# Patient Record
Sex: FEMALE | Race: WHITE | Employment: UNEMPLOYED | URBAN - METROPOLITAN AREA
[De-identification: names, ages, dates, MRNs, and addresses within clinical notes are randomized per-mention and may not be internally consistent; named-entity substitution may affect disease eponyms.]

---

## 2017-08-28 ENCOUNTER — HOSPITAL ENCOUNTER (OUTPATIENT)
Dept: PREADMISSION TESTING | Age: 47
Discharge: HOME | End: 2017-08-28
Payer: COMMERCIAL

## 2017-08-28 VITALS — BODY MASS INDEX: 18.96 KG/M2 | HEIGHT: 63 IN | WEIGHT: 107 LBS

## 2017-08-28 LAB
HCT VFR BLD AUTO: 35.1 % (ref 35–45)
HGB BLD-MCNC: 11.5 G/DL (ref 12–16)

## 2017-08-28 PROCEDURE — 85018 HEMOGLOBIN: CPT | Performed by: ANESTHESIOLOGY

## 2017-08-28 RX ORDER — DICYCLOMINE HYDROCHLORIDE 20 MG/1
20 TABLET ORAL
COMMUNITY

## 2017-08-28 RX ORDER — SODIUM CHLORIDE, SODIUM LACTATE, POTASSIUM CHLORIDE, CALCIUM CHLORIDE 600; 310; 30; 20 MG/100ML; MG/100ML; MG/100ML; MG/100ML
125 INJECTION, SOLUTION INTRAVENOUS CONTINUOUS
Status: CANCELLED | OUTPATIENT
Start: 2017-08-28

## 2017-08-30 NOTE — H&P
PATIENT: Mena Monaco  YOB: 1970  DATE: 2017 2:45 PM   VISIT TYPE: Consult  _____________________________________________________________    Completed Orders (this encounter)  Order Interpretation Result Next Lab Date   surgery instuctions given education        Assessment/Plan  # Detail Type Description    1. Assessment Calculus of gallbladder w chronic cholecyst w/o obstruction (K80.10). Patient Plan  discussed the pathophysiology of gallstone disease, stones will not resolve spontaneousl,y discussed the risks of suregry versus no surgery, discussed choledocolithiasis, gallstone pancreatitis and acute cholecystitis                  This 52year old female presents for Gallbladder Disease. History of Present Illness:  1. Gallbladder Disease      Onset was 2 weeks ago. Severity: 5. The problem is improving. It occurs intermittently. Location is epigastric and RUQ. There is no radiation. The patient describes it as colicky. Context includes after meals. There are no identified risk factors. Symptom is aggravated by fatty foods. Denies relieving factors. Additional information:  US shows gallstones, no jaundice, no f/c, pain has resolved.                 PROBLEM LIST:  Problem Description Onset Date   Acquired curvature of spine 2009   Endometriosis (clinical) 2009   Vitamin D deficiency 2011   Irritable bowel syndrome 2010       PAST MEDICAL/SURGICAL HISTORY  (Detailed)    Disease/disorder Onset Date Management Date Comments   proctoplasty 2002        Hysterectomy, total abdominal, BSO       Bilateral tubal ligation       Family History:  (Detailed)  Relationship Family Member Name  Age at Death Condition Onset Age Cause of Death       No family history of Cancer, breast  N   Maternal grandmother    Diabetes mellitus  N   Maternal grandmother    Stroke  N   Mother    Coronary artery disease  N       Social History:  (Detailed)  Tobacco use reviewed. The patient is right-handed. Preferred language is [place]. MARITAL STATUS/FAMILY/SOCIAL SUPPORT  Currently . CHILDREN  Has children:  1 daughter(s). Smoking status: Never smoker. SMOKING STATUS  Use Status Type Smoking Status Usage Per Day Years Used Total Pack Years   no/never  Never smoker          ALCOHOL  There is a history of alcohol use. consumed rarely. CAFFEINE  The patient uses caffeine: coffee and soda. Alejandra Loose Christianity/SPIRITUAL  The patient has None Alevism affiliation. Patient Status   Completed with information received for patient transitioning into care. Completed with information received for patient in a summary of care record. Medication Reconciliation  Medications reconciled today. Allergies:  Ingredient Reaction Medication Name Comment   IODINE Unknown     SERTRALINE HCL headache  Zoloft   (Reviewed, no changes.)  Review of Systems  System Neg/Pos Details   Constitutional Negative Chills, fever, night sweats and weight loss. ENMT Negative Hearing loss. Respiratory Negative Cough, dyspnea, known TB exposure and wheezing. Cardio Negative Chest pain, claudication, edema and irregular heartbeat/palpitations. GI Positive Abdominal pain, Bloating. GI Negative Abdominal mass, blood in stool, constipation, diarrhea, jaundice, nausea and vomiting.  Negative Dysuria, hematuria and urinary incontinence. Endocrine Negative Cold intolerance. Neuro Negative Headache and seizures. Integumentary Negative Change in shape/size of mole(s) and skin lesion. MS Negative Back pain and muscle weakness. Hema/Lymph Negative Easy bleeding and easy bruising. Allergic/Immuno Negative Contact allergy. Reproductive Negative Vaginal discharge.           Vital Signs     Height  Time ft in cm Last Measured Height Position   2:38 PM 5.0 3.00 160.02 08/22/2017 Standing     Weight/BSA/BMI  Time lb oz kg Context BMI kg/m2 BSA m2   2:38 .00 49.895 dressed with shoes 19.49      Blood Pressure  Time BP mm/Hg Position Side Site Method Cuff Size   2:38 /67 sitting right arm automatic adult     Temperature/Pulse/Respiration  Time Temp F Temp C Temp Site Pulse/min Pattern Resp/ min   2:38 PM 98.00 36.67 ear 87 regular      Measured By  Time Measured by   2:38 PM Chris Burton       Physical Exam:  Exam Findings Details   Constitutional Normal No acute distress. Well nourished. Well developed. Eyes Normal General - Right: Normal, Left: Normal. Sclera - Right: Normal, Left: Normal.   Neck Exam Normal Inspection - Normal.   Respiratory Normal Cough - Absent. Effort - Normal.   Cardiovascular Normal Inspection - JVD: Absent. Heart rate - Regular rate. Rhythm - Regular. Abdomen Normal Patient is not obese. No abdominal tenderness. No hepatic enlargement. No spleen enlargement. No hernia. No ascites. No palpable mass. Skin * Inspection - General inspection: warm and dry, anicteric. Extremity Normal No Cyanosis. No Edema. Neurological Normal Level of consciousness - Normal. Orientation - Normal. Memory - Normal. Hand dominance - Right-handed. Psychiatric Normal Orientation - Oriented to time, place, person & situation. No agitation. Not anxious. Appropriate mood and affect.          Medications (added, continued, or stopped this visit):  Started Medication Directions Instruction Stopped   03/15/2017 alprazolam 0.5 mg tablet take 0.5 - 1 tablet by ORAL route  in PM as needed     02/26/2013 EpiPen 0.3 mg/0.3 mL (1:1,000) IM Injector inject (0.3MG)  by intramuscular route onceas needed for anaphylaxis     08/11/2017 ibuprofen 800 mg tablet take 1 tablet by oral route 3 times every day with food     08/11/2017 Tylenol Extra Strength 500 mg tablet take 2 tablet by oral route  every 4 - 6 hours as needed not to exceed 8 tablets per 24hrs     Completed by:        Robyn Linton 08/24/2017 5:36 PM   Document generated by:  Tenna Lennox 08/24/2017 05:36 PM     -----------------------------------------------------------------------------------------------------------                          Electronically signed by Juany Gomes MD on 08/25/2017 08:44 AM

## 2017-08-31 ENCOUNTER — ANESTHESIA EVENT (OUTPATIENT)
Dept: SURGERY | Age: 47
End: 2017-08-31
Payer: COMMERCIAL

## 2017-08-31 ENCOUNTER — HOSPITAL ENCOUNTER (OUTPATIENT)
Age: 47
Setting detail: OUTPATIENT SURGERY
Discharge: HOME | End: 2017-08-31
Attending: SURGERY | Admitting: SURGERY
Payer: COMMERCIAL

## 2017-08-31 ENCOUNTER — ANESTHESIA (OUTPATIENT)
Dept: SURGERY | Age: 47
End: 2017-08-31
Payer: COMMERCIAL

## 2017-08-31 VITALS
RESPIRATION RATE: 18 BRPM | BODY MASS INDEX: 19.4 KG/M2 | SYSTOLIC BLOOD PRESSURE: 137 MMHG | HEIGHT: 62 IN | DIASTOLIC BLOOD PRESSURE: 74 MMHG | WEIGHT: 105.44 LBS | OXYGEN SATURATION: 99 % | TEMPERATURE: 98.1 F | HEART RATE: 70 BPM

## 2017-08-31 PROCEDURE — 77030008603 HC TRCR ENDOSC EPATH J&J -C: Performed by: SURGERY

## 2017-08-31 PROCEDURE — 77030020053 HC ELECTRD LAPSCP COVD -B: Performed by: SURGERY

## 2017-08-31 PROCEDURE — 77030008602 HC TRCR ENDOSC EPATH J&J -B: Performed by: SURGERY

## 2017-08-31 PROCEDURE — 74011250636 HC RX REV CODE- 250/636

## 2017-08-31 PROCEDURE — 77030018836 HC SOL IRR NACL ICUM -A: Performed by: SURGERY

## 2017-08-31 PROCEDURE — 77030008683 HC TU ET CUF COVD -A: Performed by: NURSE ANESTHETIST, CERTIFIED REGISTERED

## 2017-08-31 PROCEDURE — 77030032490 HC SLV COMPR SCD KNE COVD -B: Performed by: SURGERY

## 2017-08-31 PROCEDURE — 77030008518 HC TBNG INSUF ENDO STRY -B: Performed by: SURGERY

## 2017-08-31 PROCEDURE — 74011000250 HC RX REV CODE- 250

## 2017-08-31 PROCEDURE — 77030010030: Performed by: SURGERY

## 2017-08-31 PROCEDURE — 76210000016 HC OR PH I REC 1 TO 1.5 HR: Performed by: SURGERY

## 2017-08-31 PROCEDURE — 77030020782 HC GWN BAIR PAWS FLX 3M -B: Performed by: SURGERY

## 2017-08-31 PROCEDURE — 77030018875 HC APPL CLP LIG4 J&J -B: Performed by: SURGERY

## 2017-08-31 PROCEDURE — 76010000149 HC OR TIME 1 TO 1.5 HR: Performed by: SURGERY

## 2017-08-31 PROCEDURE — 77030003580 HC NDL INSUF VERES J&J -B: Performed by: SURGERY

## 2017-08-31 PROCEDURE — 76060000033 HC ANESTHESIA 1 TO 1.5 HR: Performed by: SURGERY

## 2017-08-31 PROCEDURE — 77030002935 HC SUT MCRYL J&J -C: Performed by: SURGERY

## 2017-08-31 PROCEDURE — 74011000250 HC RX REV CODE- 250: Performed by: SURGERY

## 2017-08-31 PROCEDURE — 77030008477 HC STYL SATN SLP COVD -A: Performed by: NURSE ANESTHETIST, CERTIFIED REGISTERED

## 2017-08-31 PROCEDURE — 77030031139 HC SUT VCRL2 J&J -A: Performed by: SURGERY

## 2017-08-31 PROCEDURE — 88304 TISSUE EXAM BY PATHOLOGIST: CPT | Performed by: SURGERY

## 2017-08-31 PROCEDURE — 76210000021 HC REC RM PH II 0.5 TO 1 HR: Performed by: SURGERY

## 2017-08-31 PROCEDURE — 74011250637 HC RX REV CODE- 250/637: Performed by: ANESTHESIOLOGY

## 2017-08-31 PROCEDURE — 77030020255 HC SOL INJ LR 1000ML BG: Performed by: SURGERY

## 2017-08-31 RX ORDER — FENTANYL CITRATE 50 UG/ML
INJECTION, SOLUTION INTRAMUSCULAR; INTRAVENOUS AS NEEDED
Status: DISCONTINUED | OUTPATIENT
Start: 2017-08-31 | End: 2017-08-31 | Stop reason: HOSPADM

## 2017-08-31 RX ORDER — NALOXONE HYDROCHLORIDE 0.4 MG/ML
INJECTION, SOLUTION INTRAMUSCULAR; INTRAVENOUS; SUBCUTANEOUS AS NEEDED
Status: DISCONTINUED | OUTPATIENT
Start: 2017-08-31 | End: 2017-08-31 | Stop reason: HOSPADM

## 2017-08-31 RX ORDER — PROPOFOL 10 MG/ML
INJECTION, EMULSION INTRAVENOUS AS NEEDED
Status: DISCONTINUED | OUTPATIENT
Start: 2017-08-31 | End: 2017-08-31 | Stop reason: HOSPADM

## 2017-08-31 RX ORDER — ONDANSETRON 2 MG/ML
4 INJECTION INTRAMUSCULAR; INTRAVENOUS ONCE
Status: DISCONTINUED | OUTPATIENT
Start: 2017-08-31 | End: 2017-08-31 | Stop reason: HOSPADM

## 2017-08-31 RX ORDER — DEXTROSE 50 % IN WATER (D50W) INTRAVENOUS SYRINGE
25-50 AS NEEDED
Status: DISCONTINUED | OUTPATIENT
Start: 2017-08-31 | End: 2017-08-31 | Stop reason: HOSPADM

## 2017-08-31 RX ORDER — ONDANSETRON 2 MG/ML
INJECTION INTRAMUSCULAR; INTRAVENOUS AS NEEDED
Status: DISCONTINUED | OUTPATIENT
Start: 2017-08-31 | End: 2017-08-31 | Stop reason: HOSPADM

## 2017-08-31 RX ORDER — SODIUM CHLORIDE, SODIUM LACTATE, POTASSIUM CHLORIDE, CALCIUM CHLORIDE 600; 310; 30; 20 MG/100ML; MG/100ML; MG/100ML; MG/100ML
50 INJECTION, SOLUTION INTRAVENOUS CONTINUOUS
Status: DISCONTINUED | OUTPATIENT
Start: 2017-08-31 | End: 2017-08-31 | Stop reason: HOSPADM

## 2017-08-31 RX ORDER — MIDAZOLAM HYDROCHLORIDE 1 MG/ML
INJECTION, SOLUTION INTRAMUSCULAR; INTRAVENOUS AS NEEDED
Status: DISCONTINUED | OUTPATIENT
Start: 2017-08-31 | End: 2017-08-31 | Stop reason: HOSPADM

## 2017-08-31 RX ORDER — OXYCODONE AND ACETAMINOPHEN 5; 325 MG/1; MG/1
1 TABLET ORAL AS NEEDED
Status: DISCONTINUED | OUTPATIENT
Start: 2017-08-31 | End: 2017-08-31 | Stop reason: HOSPADM

## 2017-08-31 RX ORDER — GLYCOPYRROLATE 0.2 MG/ML
INJECTION INTRAMUSCULAR; INTRAVENOUS AS NEEDED
Status: DISCONTINUED | OUTPATIENT
Start: 2017-08-31 | End: 2017-08-31 | Stop reason: HOSPADM

## 2017-08-31 RX ORDER — BUPIVACAINE HYDROCHLORIDE 5 MG/ML
INJECTION, SOLUTION EPIDURAL; INTRACAUDAL AS NEEDED
Status: DISCONTINUED | OUTPATIENT
Start: 2017-08-31 | End: 2017-08-31 | Stop reason: HOSPADM

## 2017-08-31 RX ORDER — FENTANYL CITRATE 50 UG/ML
25 INJECTION, SOLUTION INTRAMUSCULAR; INTRAVENOUS
Status: ACTIVE | OUTPATIENT
Start: 2017-08-31 | End: 2017-08-31

## 2017-08-31 RX ORDER — MAGNESIUM SULFATE 100 %
4 CRYSTALS MISCELLANEOUS AS NEEDED
Status: DISCONTINUED | OUTPATIENT
Start: 2017-08-31 | End: 2017-08-31 | Stop reason: HOSPADM

## 2017-08-31 RX ORDER — HYDROMORPHONE HYDROCHLORIDE 2 MG/ML
0.5 INJECTION, SOLUTION INTRAMUSCULAR; INTRAVENOUS; SUBCUTANEOUS
Status: DISCONTINUED | OUTPATIENT
Start: 2017-08-31 | End: 2017-08-31 | Stop reason: HOSPADM

## 2017-08-31 RX ORDER — OXYCODONE AND ACETAMINOPHEN 5; 325 MG/1; MG/1
1 TABLET ORAL
Qty: 30 TAB | Refills: 0 | Status: SHIPPED | OUTPATIENT
Start: 2017-08-31

## 2017-08-31 RX ORDER — INSULIN LISPRO 100 [IU]/ML
INJECTION, SOLUTION INTRAVENOUS; SUBCUTANEOUS ONCE
Status: DISCONTINUED | OUTPATIENT
Start: 2017-08-31 | End: 2017-08-31 | Stop reason: HOSPADM

## 2017-08-31 RX ORDER — SODIUM CHLORIDE, SODIUM LACTATE, POTASSIUM CHLORIDE, CALCIUM CHLORIDE 600; 310; 30; 20 MG/100ML; MG/100ML; MG/100ML; MG/100ML
125 INJECTION, SOLUTION INTRAVENOUS CONTINUOUS
Status: DISCONTINUED | OUTPATIENT
Start: 2017-08-31 | End: 2017-08-31 | Stop reason: HOSPADM

## 2017-08-31 RX ORDER — SODIUM CHLORIDE, SODIUM LACTATE, POTASSIUM CHLORIDE, CALCIUM CHLORIDE 600; 310; 30; 20 MG/100ML; MG/100ML; MG/100ML; MG/100ML
INJECTION, SOLUTION INTRAVENOUS
Status: DISCONTINUED | OUTPATIENT
Start: 2017-08-31 | End: 2017-08-31 | Stop reason: HOSPADM

## 2017-08-31 RX ORDER — NALOXONE HYDROCHLORIDE 0.4 MG/ML
0.1 INJECTION, SOLUTION INTRAMUSCULAR; INTRAVENOUS; SUBCUTANEOUS
Status: DISCONTINUED | OUTPATIENT
Start: 2017-08-31 | End: 2017-08-31 | Stop reason: HOSPADM

## 2017-08-31 RX ORDER — LIDOCAINE HYDROCHLORIDE 20 MG/ML
INJECTION, SOLUTION EPIDURAL; INFILTRATION; INTRACAUDAL; PERINEURAL AS NEEDED
Status: DISCONTINUED | OUTPATIENT
Start: 2017-08-31 | End: 2017-08-31 | Stop reason: HOSPADM

## 2017-08-31 RX ORDER — SODIUM CHLORIDE 0.9 % (FLUSH) 0.9 %
5-10 SYRINGE (ML) INJECTION AS NEEDED
Status: DISCONTINUED | OUTPATIENT
Start: 2017-08-31 | End: 2017-08-31 | Stop reason: HOSPADM

## 2017-08-31 RX ORDER — ROCURONIUM BROMIDE 10 MG/ML
INJECTION, SOLUTION INTRAVENOUS AS NEEDED
Status: DISCONTINUED | OUTPATIENT
Start: 2017-08-31 | End: 2017-08-31 | Stop reason: HOSPADM

## 2017-08-31 RX ORDER — NEOSTIGMINE METHYLSULFATE 1 MG/ML
INJECTION INTRAVENOUS AS NEEDED
Status: DISCONTINUED | OUTPATIENT
Start: 2017-08-31 | End: 2017-08-31 | Stop reason: HOSPADM

## 2017-08-31 RX ORDER — DEXAMETHASONE SODIUM PHOSPHATE 4 MG/ML
INJECTION, SOLUTION INTRA-ARTICULAR; INTRALESIONAL; INTRAMUSCULAR; INTRAVENOUS; SOFT TISSUE AS NEEDED
Status: DISCONTINUED | OUTPATIENT
Start: 2017-08-31 | End: 2017-08-31 | Stop reason: HOSPADM

## 2017-08-31 RX ADMIN — FENTANYL CITRATE 50 MCG: 50 INJECTION, SOLUTION INTRAMUSCULAR; INTRAVENOUS at 11:34

## 2017-08-31 RX ADMIN — ONDANSETRON 4 MG: 2 INJECTION INTRAMUSCULAR; INTRAVENOUS at 11:41

## 2017-08-31 RX ADMIN — SODIUM CHLORIDE, SODIUM LACTATE, POTASSIUM CHLORIDE, CALCIUM CHLORIDE: 600; 310; 30; 20 INJECTION, SOLUTION INTRAVENOUS at 11:56

## 2017-08-31 RX ADMIN — NALOXONE HYDROCHLORIDE 0.02 MG: 0.4 INJECTION, SOLUTION INTRAMUSCULAR; INTRAVENOUS; SUBCUTANEOUS at 12:21

## 2017-08-31 RX ADMIN — GLYCOPYRROLATE 0.2 MG: 0.2 INJECTION INTRAMUSCULAR; INTRAVENOUS at 12:02

## 2017-08-31 RX ADMIN — NEOSTIGMINE METHYLSULFATE 2 MG: 1 INJECTION INTRAVENOUS at 12:02

## 2017-08-31 RX ADMIN — PROPOFOL 50 MG: 10 INJECTION, EMULSION INTRAVENOUS at 11:48

## 2017-08-31 RX ADMIN — LIDOCAINE HYDROCHLORIDE 60 MG: 20 INJECTION, SOLUTION EPIDURAL; INFILTRATION; INTRACAUDAL; PERINEURAL at 11:34

## 2017-08-31 RX ADMIN — MIDAZOLAM HYDROCHLORIDE 2 MG: 1 INJECTION, SOLUTION INTRAMUSCULAR; INTRAVENOUS at 11:26

## 2017-08-31 RX ADMIN — DEXAMETHASONE SODIUM PHOSPHATE 4 MG: 4 INJECTION, SOLUTION INTRA-ARTICULAR; INTRALESIONAL; INTRAMUSCULAR; INTRAVENOUS; SOFT TISSUE at 11:41

## 2017-08-31 RX ADMIN — ROCURONIUM BROMIDE 25 MG: 10 INJECTION, SOLUTION INTRAVENOUS at 11:34

## 2017-08-31 RX ADMIN — PROPOFOL 120 MG: 10 INJECTION, EMULSION INTRAVENOUS at 11:34

## 2017-08-31 RX ADMIN — OXYCODONE HYDROCHLORIDE AND ACETAMINOPHEN 1 TABLET: 5; 325 TABLET ORAL at 14:17

## 2017-08-31 RX ADMIN — SODIUM CHLORIDE, SODIUM LACTATE, POTASSIUM CHLORIDE, CALCIUM CHLORIDE: 600; 310; 30; 20 INJECTION, SOLUTION INTRAVENOUS at 11:26

## 2017-08-31 RX ADMIN — FENTANYL CITRATE 50 MCG: 50 INJECTION, SOLUTION INTRAMUSCULAR; INTRAVENOUS at 11:26

## 2017-08-31 NOTE — IP AVS SNAPSHOT
[address] [place] [id number] Patient: Yenny Reddy MRN: [id number] BBX:[id number] You are allergic to the following Allergen Reactions Iodine Anaphylaxis Hives Shortness of Breath Rash Itching Swelling Shellfish Derived Anaphylaxis Recent Documentation Height Weight BMI OB Status Smoking Status 1.575 m 47.8 kg 19.28 kg/m2 Hysterectomy Never Smoker Emergency Contacts Name Discharge Info Relation Home Work Mobile [address] CAREGIVER [3] Spouse [3] [phone] About your hospitalization You were admitted on:  August 31, 2017 You last received care in the:  [facility] PHASE 2 RECOVERY You were discharged on:  August 31, 2017 Unit phone number:    
  
Why you were hospitalized Your primary diagnosis was:  Not on File Providers Seen During Your Hospitalizations Provider Role Specialty Primary office phone Paula Valladares MD Attending Provider General Surgery [phone] Your Primary Care Physician (PCP) Primary Care Physician Office Phone Office Fax [place], [address] [facility] [phone] Follow-up Information Follow up With Details Comments Contact Info José Berry MD   [address] [facility] 
[address] [place] [id number] [phone] Paula Valladares MD Follow up in 1 week(s)  [address] [facility] [address] [facility] 
[phone] Current Discharge Medication List  
  
START taking these medications Dose & Instructions Dispensing Information Comments Morning Noon Evening Bedtime  
 oxyCODONE-acetaminophen 5-325 mg per tablet Commonly known as:  PERCOCET Your last dose was: Your next dose is:    
   
   
 Dose:  1 Tab Take 1 Tab by mouth every six (6) hours as needed for Pain. Max Daily Amount: 4 Tabs. Quantity:  30 Tab Refills:  0 CONTINUE these medications which have NOT CHANGED Dose & Instructions Dispensing Information Comments Morning Noon Evening Bedtime BENTYL 20 mg tablet Generic drug:  dicyclomine Your last dose was: Your next dose is:    
   
   
 Dose:  20 mg Take 20 mg by mouth four (4) times daily as needed. Refills:  0 Where to Get Your Medications Information on where to get these meds will be given to you by the nurse or doctor. ! Ask your nurse or doctor about these medications  
  oxyCODONE-acetaminophen 5-325 mg per tablet Discharge Instructions Cholecystectomy: What to Expect at [facility] Your Recovery After your surgery, it is normal to feel weak and tired for several days after you return home. Your belly may be swollen. If you had laparoscopic surgery, you may also have pain in your shoulder for about 24 hours. You may have gas or need to burp a lot at first, and a few people get diarrhea. The diarrhea usually goes away in 2 to 4 weeks, but it may last longer. How quickly you recover depends on whether you had a laparoscopic or open surgery. · For a laparoscopic surgery, most people can go back to work or their normal routine in 1 to 2 weeks, but it may take longer, depending on the type of work you do. · For an open surgery, it will probably take 4 to 6 weeks before you get back to your normal routine. This care sheet gives you a general idea about how long it will take for you to recover. However, each person recovers at a different pace. Follow the steps below to get better as quickly as possible. How can you care for yourself at home? Activity · Rest when you feel tired. Getting enough sleep will help you recover. · Try to walk each day. Start out by walking a little more than you did the day before. Gradually increase the amount you walk.  Walking boosts blood flow and helps prevent pneumonia and constipation. · For about 2 to 4 weeks, avoid lifting anything that would make you strain. This may include a child, heavy grocery bags and milk containers, a heavy briefcase or backpack, cat litter or dog food bags, or a vacuum . · Avoid strenuous activities, such as biking, jogging, weightlifting, and aerobic exercise, until your doctor says it is okay. · You may shower 24 to 48 hours after surgery, if your doctor okays it. Pat the cut (incision) dry. Do not take a bath for the first 2 weeks, or until your doctor tells you it is okay. · You may drive when you are no longer taking pain medicine and can quickly move your foot from the gas pedal to the brake. You must also be able to sit comfortably for a long period of time, even if you do not plan to go far. You might get caught in traffic. · For a laparoscopic surgery, most people can go back to work or their normal routine in 1 to 2 weeks, but it may take longer. For an open surgery, it will probably take 4 to 6 weeks before you get back to your normal routine. · Your doctor will tell you when you can have sex again. Diet · Eat smaller meals more often instead of fewer larger meals. You can eat a normal diet, but avoid eating fatty foods for about 1 month. Fatty foods include hamburger, whole milk, cheese, and many snack foods. If your stomach is upset, try bland, low-fat foods like plain rice, broiled chicken, toast, and yogurt. · Drink plenty of fluids (unless your doctor tells you not to). · If you have diarrhea, try avoiding spicy foods, dairy products, fatty foods, and alcohol. You can also watch to see if specific foods cause it, and stop eating them. If the diarrhea continues for more than 2 weeks, talk to your doctor. · You may notice that your bowel movements are not regular right after your surgery. This is common.  Try to avoid constipation and straining with bowel movements. You may want to take a fiber supplement every day. If you have not had a bowel movement after a couple of days, ask your doctor about taking a mild laxative. Medicines · Your doctor will tell you if and when you can restart your medicines. He or she will also give you instructions about taking any new medicines. · If you take blood thinners, such as warfarin (Coumadin), clopidogrel (Plavix), or aspirin, be sure to talk to your doctor. He or she will tell you if and when to start taking those medicines again. Make sure that you understand exactly what your doctor wants you to do. · Take pain medicines exactly as directed. ¨ If the doctor gave you a prescription medicine for pain, take it as prescribed. ¨ If you are not taking a prescription pain medicine, take an over-the-counter medicine such as acetaminophen (Tylenol), ibuprofen (Advil, Motrin), or naproxen (Aleve). Read and follow all instructions on the label. ¨ Do not take two or more pain medicines at the same time unless the doctor told you to. Many pain medicines contain acetaminophen, which is Tylenol. Too much Tylenol can be harmful. · If you think your pain medicine is making you sick to your stomach: 
¨ Take your medicine after meals (unless your doctor tells you not to). ¨ Ask your doctor for a different pain medicine. · If your doctor prescribed antibiotics, take them as directed. Do not stop taking them just because you feel better. You need to take the full course of antibiotics. Incision care · If you have strips of tape on the incision, or cut, leave the tape on for a week or until it falls off. · After 24 to 48 hours, wash the area daily with warm, soapy water, and pat it dry. · You may have staples to hold the cut together. Keep them dry until your doctor takes them out. This is usually in 7 to 10 days. · Keep the area clean and dry.  You may cover it with a gauze bandage if it weeps or rubs against clothing. Change the bandage every day. Ice · To reduce swelling and pain, put ice or a cold pack on your belly for 10 to 20 minutes at a time. Do this every 1 to 2 hours. Put a thin cloth between the ice and your skin. Follow-up care is a key part of your treatment and safety. Be sure to make and go to all appointments, and call your doctor if you are having problems. It's also a good idea to know your test results and keep a list of the medicines you take. When should you call for help? Call 911 anytime you think you may need emergency care. For example, call if: 
· You passed out (lost consciousness). · You have severe trouble breathing. · You have sudden chest pain and shortness of breath, or you cough up blood. Call your doctor now or seek immediate medical care if: 
· You are sick to your stomach and cannot drink fluids. · You have pain that does not get better when you take your pain medicine. · You have signs of infection, such as: 
¨ Increased pain, swelling, warmth, or redness. ¨ Red streaks leading from the incision. ¨ Pus draining from the incision. ¨ Swollen lymph nodes in your neck, armpits, or groin. ¨ A fever. · Your urine turns dark brown or your stool is light-colored or damon-colored. · Your skin or the whites of your eyes turn yellow. · Bright red blood has soaked through a large bandage over your incision. · You have signs of a blood clot, such as: 
¨ Pain in your calf, back of knee, thigh, or groin. ¨ Redness and swelling in your leg or groin. · You have trouble passing urine or stool, especially if you have mild pain or swelling in your lower belly. Watch closely for any changes in your health, and be sure to contact your doctor if: 
· You had a laparoscopic surgery and your shoulder pain lasts more than 24 hours. · You do not have a bowel movement after taking a laxative. Where can you learn more? Go to http://beatriz-lilo.info/. Enter [phone] in the search box to learn more about \"Cholecystectomy: What to Expect at Home. \" Current as of: August 9, 2016 Content Version: 11.3 © 5825-3267 "[employer]. Care instructions adapted under license by [employer] (which disclaims liability or warranty for this information). If you have questions about a medical condition or this instruction, always ask your healthcare professional. [place] [address] any warranty or liability for your use of this information. DISCHARGE SUMMARY from Nurse The following personal items are in your possession at time of discharge: 
 
Dental Appliances: None Visual Aid: Glasses Home Medications: None Jewelry: None Clothing:  (locker 4) Other Valuables: Eyeglasses (will give to ) PATIENT INSTRUCTIONS: 
 
After general anesthesia or intravenous sedation, for 24 hours or while taking prescription Narcotics: · Limit your activities · Do not drive and operate hazardous machinery · Do not make important personal or business decisions · Do  not drink alcoholic beverages · If you have not urinated within 8 hours after discharge, please contact your surgeon on call. Report the following to your surgeon: 
· Excessive pain, swelling, redness or odor of or around the surgical area · Temperature over 100.5 · Nausea and vomiting lasting longer than 4 hours or if unable to take medications · Any signs of decreased circulation or nerve impairment to extremity: change in color, persistent  numbness, tingling, coldness or increase pain · Any questions What to do at Home: 
Recommended activity: Activity as tolerated and Ambulate in house, no strenuous activity until cleared.  
 
If you experience any of the following symptoms fever greater than 101.0, chills, nausea or vomiting lasting longer than 4-hours, drainage from incision sites, pain not relieved with medication, please follow up with Dr. Leonid Munoz. Diet as tolerated. Increase fluid intake. If taking narcotic pain medication and no bowel movement in 2-days, take stool softener. Burping will remove air from abdomen and decrease pain. Dressings may be removed in 48 hours. You may shower in 48 hours. *  Please give a list of your current medications to your Primary Care Provider. *  Please update this list whenever your medications are discontinued, doses are 
    changed, or new medications (including over-the-counter products) are added. *  Please carry medication information at all times in case of emergency situations. These are general instructions for a healthy lifestyle: No smoking/ No tobacco products/ Avoid exposure to second hand smoke Surgeon General's Warning:  Quitting smoking now greatly reduces serious risk to your health. Obesity, smoking, and sedentary lifestyle greatly increases your risk for illness A healthy diet, regular physical exercise & weight monitoring are important for maintaining a healthy lifestyle You may be retaining fluid if you have a history of heart failure or if you experience any of the following symptoms:  Weight gain of 3 pounds or more overnight or 5 pounds in a week, increased swelling in our hands or feet or shortness of breath while lying flat in bed. Please call your doctor as soon as you notice any of these symptoms; do not wait until your next office visit. Recognize signs and symptoms of STROKE: 
 
F-face looks uneven A-arms unable to move or move unevenly S-speech slurred or non-existent T-time-call 911 as soon as signs and symptoms begin-DO NOT go Back to bed or wait to see if you get better-TIME IS BRAIN. Warning Signs of HEART ATTACK Call 911 if you have these symptoms: 
? Chest discomfort.  Most heart attacks involve discomfort in the center of the chest that lasts more than a few minutes, or that goes away and comes back. It can feel like uncomfortable pressure, squeezing, fullness, or pain. ? Discomfort in other areas of the upper body. Symptoms can include pain or discomfort in one or both arms, the back, neck, jaw, or stomach. ? Shortness of breath with or without chest discomfort. ? Other signs may include breaking out in a cold sweat, nausea, or lightheadedness. Don't wait more than five minutes to call [address]! Fast action can save your life. Calling 911 is almost always the fastest way to get lifesaving treatment. Emergency Medical Services staff can begin treatment when they arrive  up to an hour sooner than if someone gets to the hospital by car. The discharge information has been reviewed with the patient and caregiver. The patient and caregiver verbalized understanding. Discharge medications reviewed with the patient and caregiver and appropriate educational materials and side effects teaching were provided. Patient armband removed and shredded. Discharge Orders None Introducing [facility] SERVICES! [facility] introduces [employer] patient portal. Now you can access parts of your medical record, email your doctor's office, and request medication refills online. 1. In your internet browser, go to [email or web address] 2. Click on the First Time User? Click Here link in the Sign In box. You will see the New Member Sign Up page. 3. Enter your [employer] Access Code exactly as it appears below. You will not need to use this code after youve completed the sign-up process. If you do not sign up before the expiration date, you must request a new code. · [employer] Access Code: [email or web address] Expires: 11/21/2017 11:33 AM 
 
4. Enter the last four digits of your Social Security Number (xxxx) and Date of Birth (mm/dd/yyyy) as indicated and click Submit.  You will be taken to the next sign-up page. 5. Create a [employer] ID. This will be your [employer] login ID and cannot be changed, so think of one that is secure and easy to remember. 6. Create a [employer] password. You can change your password at any time. 7. Enter your Password Reset Question and Answer. This can be used at a later time if you forget your password. 8. Enter your e-mail address. You will receive e-mail notification when new information is available in [address] 9. Click Sign Up. You can now view and download portions of your medical record. 10. Click the Download Summary menu link to download a portable copy of your medical information. If you have questions, please visit the Frequently Asked Questions section of the [employer] website. Remember, [employer] is NOT to be used for urgent needs. For medical emergencies, dial 911. Now available from your iPhone and Android! General Information Please provide this summary of care documentation to your next provider. Patient Signature:  ____________________________________________________________ Date:  ____________________________________________________________  
  
Ba Truong Provider Signature:  ____________________________________________________________ Date:  ____________________________________________________________

## 2017-08-31 NOTE — PERIOP NOTES
TRANSFER - OUT REPORT:    Verbal report given to Amy Bhatt RN on Gia Sanchez  being transferred to phase 2 (unit) for routine post - op       Report consisted of patients Situation, Background, Assessment and   Recommendations(SBAR). Information from the following report(s) SBAR, Intake/Output and MAR was reviewed with the receiving nurse. Lines:   Peripheral IV 08/31/17 Right Hand (Active)   Site Assessment Clean, dry, & intact 8/31/2017  1:15 PM   Phlebitis Assessment 0 8/31/2017  1:15 PM   Infiltration Assessment 0 8/31/2017  1:15 PM   Dressing Status Clean, dry, & intact 8/31/2017  1:15 PM   Dressing Type Transparent;Tape 8/31/2017  1:15 PM   Hub Color/Line Status Pink; Infusing;Patent 8/31/2017  1:15 PM        Opportunity for questions and clarification was provided.       Patient transported with:   [employer]

## 2017-08-31 NOTE — OP NOTES
[address] [place] [address]  OPERATIVE REPORT    Name:  Gayle Cooley  MR#:  [id number]  :  1970  Account #:  [de-identified]  Date of Adm:  2017  Date of Surgery:  2017      PREOPERATIVE DIAGNOSIS: Cholelithiasis and cholecystitis. POSTOPERATIVE DIAGNOSIS: Cholelithiasis and cholecystitis. PROCEDURES PERFORMED: Laparoscopic cholecystectomy. ATTENDING SURGEON: Sukhdeep Calloway MD.    ANESTHESIA: General and local.    INDICATIONS FOR PROCEDURE: This is a 44-year-old female with  upper abdominal pain and gallstones on ultrasound. She is brought to  the operating room for cholecystectomy. DESCRIPTION OF PROCEDURE: The patient was brought in the  operating room, placed on the table in the supine position. After  placing monitors and adequate general anesthesia, the abdomen was  prepped and draped in the usual sterile fashion. SCD hose were  placed preoperatively. A small vertical incision was made at the base  of the umbilicus through the skin down to subcutaneous tissue. A  Veress needle was placed and the abdomen was insufflated with  carbon dioxide to 15 mmHg pressure. A 5-mm port was placed at this  location, and the scope was placed into the peritoneal cavity. Two 5  mm ports were placed in the right subcostal area and a 10-mm port  was placed in the subxiphoid area. The gallbladder was distended and  looked chronically inflamed. Adhesions were taken down from the body  of the gallbladder to the infundibulum. The body of the gallbladder was  removed from the liver with electrocautery and dissection was carried  down toward the cystic duct. The cystic duct was dissected out. The  common bile duct was identified and it was uninflamed and normal  size. The cystic duct was clipped and divided. The cystic artery was  dissected out. It was clipped and divided. Then, the rest of the  gallbladder was removed from the liver with the electrocautery.  The  gallbladder was brought out the subxiphoid port. The right upper  quadrant was irrigated with saline. There was no active bleeding or  evidence of a bile leak. The ports were removed under direct  visualization. There was no active bleeding. Fascial defect at the  subxiphoid area and at the umbilicus was closed with interrupted 0  Vicryl suture. Marcaine was injected locally and 4-0 Monocryl was  used to reapproximate the skin. Steri-Strips were applied and the  wounds were dressed sterilely. The sponge, instrument, and needle  count was correct at the end of the procedure. ESTIMATED BLOOD LOSS: 5 mL. SPECIMENS REMOVED: Gallbladder with contents.         MD JEANNIE Brasher / Ivan Brian  D:  08/31/2017   12:07  T:  08/31/2017   13:27  Job #:  [id number]

## 2017-08-31 NOTE — PERIOP NOTES
TRANSFER - IN REPORT:    Verbal report received from RYLEE Cadet RN and (name) on [employer]  being received from OR(unit) for routine post - op      Report consisted of patients Situation, Background, Assessment and   Recommendations(SBAR). Information from the following report(s) SBAR, OR Summary, Procedure Summary, Intake/Output and MAR was reviewed with the receiving nurse. Opportunity for questions and clarification was provided. Assessment completed upon patients arrival to unit and care assumed.

## 2017-08-31 NOTE — INTERVAL H&P NOTE
H&P Update: Glady Lanes was seen and examined. History and physical has been reviewed. The patient has been examined. There have been no significant clinical changes since the completion of the originally dated History and Physical.  Patient identified by surgeon; surgical site was confirmed by patient and surgeon.     Signed By: Wei Garcia MD     August 31, 2017 11:15 AM

## 2017-08-31 NOTE — ANESTHESIA POSTPROCEDURE EVALUATION
Post-Anesthesia Evaluation and Assessment    Cardiovascular Function/Vital Signs  Visit Vitals    /76    Pulse 68    Temp 36.5 °C (97.7 °F)    Resp 17    Ht 5' 2\" (1.575 m)    Wt 47.8 kg (105 lb 7 oz)    SpO2 100%    BMI 19.28 kg/m2       Patient is status post Procedure(s):  LAPAROSCOPIC CHOLECYSTECTOMY. Nausea/Vomiting: Controlled. Postoperative hydration reviewed and adequate. Pain:  Pain Scale 1: Numeric (0 - 10) (08/31/17 1340)  Pain Intensity 1: 2 (feels like gas pain) (08/31/17 1340)   Managed. Neurological Status:   Neuro (WDL): Exceptions to WDL (08/31/17 1315)   At baseline. Mental Status and Level of Consciousness: Arousable. Pulmonary Status:   O2 Device: Nasal cannula (08/31/17 1315)   Adequate oxygenation and airway patent. Complications related to anesthesia: None    Post-anesthesia assessment completed. No concerns. Patient has met all discharge requirements.     Signed By: Jose Dietrich CRNA    August 31, 2017

## 2017-08-31 NOTE — DISCHARGE INSTRUCTIONS
Cholecystectomy: What to Expect at [address] [place] [address]  After your surgery, it is normal to feel weak and tired for several days after you return home. Your belly may be swollen. If you had laparoscopic surgery, you may also have pain in your shoulder for about 24 hours. You may have gas or need to burp a lot at first, and a few people get diarrhea. The diarrhea usually goes away in 2 to 4 weeks, but it may last longer. How quickly you recover depends on whether you had a laparoscopic or open surgery. · For a laparoscopic surgery, most people can go back to work or their normal routine in 1 to 2 weeks, but it may take longer, depending on the type of work you do. · For an open surgery, it will probably take 4 to 6 weeks before you get back to your normal routine. This care sheet gives you a general idea about how long it will take for you to recover. However, each person recovers at a different pace. Follow the steps below to get better as quickly as possible. How can you care for yourself at home? Activity  · Rest when you feel tired. Getting enough sleep will help you recover. · Try to walk each day. Start out by walking a little more than you did the day before. Gradually increase the amount you walk. Walking boosts blood flow and helps prevent pneumonia and constipation. · For about 2 to 4 weeks, avoid lifting anything that would make you strain. This may include a child, heavy grocery bags and milk containers, a heavy briefcase or backpack, cat litter or dog food bags, or a vacuum . · Avoid strenuous activities, such as biking, jogging, weightlifting, and aerobic exercise, until your doctor says it is okay. · You may shower 24 to 48 hours after surgery, if your doctor okays it. Pat the cut (incision) dry. Do not take a bath for the first 2 weeks, or until your doctor tells you it is okay.   · You may drive when you are no longer taking pain medicine and can quickly move your foot from the gas pedal to the brake. You must also be able to sit comfortably for a long period of time, even if you do not plan to go far. You might get caught in traffic. · For a laparoscopic surgery, most people can go back to work or their normal routine in 1 to 2 weeks, but it may take longer. For an open surgery, it will probably take 4 to 6 weeks before you get back to your normal routine. · Your doctor will tell you when you can have sex again. Diet  · Eat smaller meals more often instead of fewer larger meals. You can eat a normal diet, but avoid eating fatty foods for about 1 month. Fatty foods include hamburger, whole milk, cheese, and many snack foods. If your stomach is upset, try bland, low-fat foods like plain rice, broiled chicken, toast, and yogurt. · Drink plenty of fluids (unless your doctor tells you not to). · If you have diarrhea, try avoiding spicy foods, dairy products, fatty foods, and alcohol. You can also watch to see if specific foods cause it, and stop eating them. If the diarrhea continues for more than 2 weeks, talk to your doctor. · You may notice that your bowel movements are not regular right after your surgery. This is common. Try to avoid constipation and straining with bowel movements. You may want to take a fiber supplement every day. If you have not had a bowel movement after a couple of days, ask your doctor about taking a mild laxative. Medicines  · Your doctor will tell you if and when you can restart your medicines. He or she will also give you instructions about taking any new medicines. · If you take blood thinners, such as warfarin (Coumadin), clopidogrel (Plavix), or aspirin, be sure to talk to your doctor. He or she will tell you if and when to start taking those medicines again. Make sure that you understand exactly what your doctor wants you to do. · Take pain medicines exactly as directed.   ¨ If the doctor gave you a prescription medicine for pain, take it as prescribed. ¨ If you are not taking a prescription pain medicine, take an over-the-counter medicine such as acetaminophen (Tylenol), ibuprofen (Advil, Motrin), or naproxen (Aleve). Read and follow all instructions on the label. ¨ Do not take two or more pain medicines at the same time unless the doctor told you to. Many pain medicines contain acetaminophen, which is Tylenol. Too much Tylenol can be harmful. · If you think your pain medicine is making you sick to your stomach:  ¨ Take your medicine after meals (unless your doctor tells you not to). ¨ Ask your doctor for a different pain medicine. · If your doctor prescribed antibiotics, take them as directed. Do not stop taking them just because you feel better. You need to take the full course of antibiotics. Incision care  · If you have strips of tape on the incision, or cut, leave the tape on for a week or until it falls off. · After 24 to 48 hours, wash the area daily with warm, soapy water, and pat it dry. · You may have staples to hold the cut together. Keep them dry until your doctor takes them out. This is usually in 7 to 10 days. · Keep the area clean and dry. You may cover it with a gauze bandage if it weeps or rubs against clothing. Change the bandage every day. Ice  · To reduce swelling and pain, put ice or a cold pack on your belly for 10 to 20 minutes at a time. Do this every 1 to 2 hours. Put a thin cloth between the ice and your skin. Follow-up care is a key part of your treatment and safety. Be sure to make and go to all appointments, and call your doctor if you are having problems. It's also a good idea to know your test results and keep a list of the medicines you take. When should you call for help? Call 911 anytime you think you may need emergency care. For example, call if:  · You passed out (lost consciousness). · You have severe trouble breathing. · You have sudden chest pain and shortness of breath, or you cough up blood.   Call your doctor now or seek immediate medical care if:  · You are sick to your stomach and cannot drink fluids. · You have pain that does not get better when you take your pain medicine. · You have signs of infection, such as:  ¨ Increased pain, swelling, warmth, or redness. ¨ Red streaks leading from the incision. ¨ Pus draining from the incision. ¨ Swollen lymph nodes in your neck, armpits, or groin. ¨ A fever. · Your urine turns dark brown or your stool is light-colored or damon-colored. · Your skin or the whites of your eyes turn yellow. · Bright red blood has soaked through a large bandage over your incision. · You have signs of a blood clot, such as:  ¨ Pain in your calf, back of knee, thigh, or groin. ¨ Redness and swelling in your leg or groin. · You have trouble passing urine or stool, especially if you have mild pain or swelling in your lower belly. Watch closely for any changes in your health, and be sure to contact your doctor if:  · You had a laparoscopic surgery and your shoulder pain lasts more than 24 hours. · You do not have a bowel movement after taking a laxative. Where can you learn more? Go to http://beatriz-lilo.info/. Enter 457 79 781 in the search box to learn more about \"Cholecystectomy: What to Expect at Home. \"  Current as of: August 9, 2016  Content Version: 11.3  © 7146-8691 [employer]. Care instructions adapted under license by [employer] (which disclaims liability or warranty for this information). If you have questions about a medical condition or this instruction, always ask your healthcare professional. [place] [address] any warranty or liability for your use of this information.       DISCHARGE SUMMARY from Nurse    The following personal items are in your possession at time of discharge:    Dental Appliances: None  Visual Aid: Glasses     Home Medications: None  Jewelry: None  Clothing:  (locker 4)  Other Valuables: Eyeglasses (will give to )             PATIENT INSTRUCTIONS:    After general anesthesia or intravenous sedation, for 24 hours or while taking prescription Narcotics:  · Limit your activities  · Do not drive and operate hazardous machinery  · Do not make important personal or business decisions  · Do  not drink alcoholic beverages  · If you have not urinated within 8 hours after discharge, please contact your surgeon on call. Report the following to your surgeon:  · Excessive pain, swelling, redness or odor of or around the surgical area  · Temperature over 100.5  · Nausea and vomiting lasting longer than 4 hours or if unable to take medications  · Any signs of decreased circulation or nerve impairment to extremity: change in color, persistent  numbness, tingling, coldness or increase pain  · Any questions        What to do at Home:  Recommended activity: Activity as tolerated and Ambulate in house, no strenuous activity until cleared. If you experience any of the following symptoms fever greater than 101.0, chills, nausea or vomiting lasting longer than 4-hours, drainage from incision sites, pain not relieved with medication, please follow up with Dr. Agapito Khalil. Diet as tolerated. Increase fluid intake. If taking narcotic pain medication and no bowel movement in 2-days, take stool softener. Burping will remove air from abdomen and decrease pain. Dressings may be removed in 48 hours. You may shower in 48 hours. *  Please give a list of your current medications to your Primary Care Provider. *  Please update this list whenever your medications are discontinued, doses are      changed, or new medications (including over-the-counter products) are added. *  Please carry medication information at all times in case of emergency situations.           These are general instructions for a healthy lifestyle:    No smoking/ No tobacco products/ Avoid exposure to second hand smoke    Surgeon Rhonda Anders Warning:  Quitting smoking now greatly reduces serious risk to your health. Obesity, smoking, and sedentary lifestyle greatly increases your risk for illness    A healthy diet, regular physical exercise & weight monitoring are important for maintaining a healthy lifestyle    You may be retaining fluid if you have a history of heart failure or if you experience any of the following symptoms:  Weight gain of 3 pounds or more overnight or 5 pounds in a week, increased swelling in our hands or feet or shortness of breath while lying flat in bed. Please call your doctor as soon as you notice any of these symptoms; do not wait until your next office visit. Recognize signs and symptoms of STROKE:    F-face looks uneven    A-arms unable to move or move unevenly    S-speech slurred or non-existent    T-time-call 911 as soon as signs and symptoms begin-DO NOT go       Back to bed or wait to see if you get better-TIME IS BRAIN. Warning Signs of HEART ATTACK     Call 911 if you have these symptoms:   Chest discomfort. Most heart attacks involve discomfort in the center of the chest that lasts more than a few minutes, or that goes away and comes back. It can feel like uncomfortable pressure, squeezing, fullness, or pain.  Discomfort in other areas of the upper body. Symptoms can include pain or discomfort in one or both arms, the back, neck, jaw, or stomach.  Shortness of breath with or without chest discomfort.  Other signs may include breaking out in a cold sweat, nausea, or lightheadedness. Don't wait more than five minutes to call 911 - MINUTES MATTER! Fast action can save your life. Calling 911 is almost always the fastest way to get lifesaving treatment. Emergency Medical Services staff can begin treatment when they arrive -- up to an hour sooner than if someone gets to the hospital by car. The discharge information has been reviewed with the patient and caregiver.   The patient and caregiver verbalized understanding. Discharge medications reviewed with the patient and caregiver and appropriate educational materials and side effects teaching were provided. Patient armband removed and shredded.
